# Patient Record
Sex: MALE | URBAN - METROPOLITAN AREA
[De-identification: names, ages, dates, MRNs, and addresses within clinical notes are randomized per-mention and may not be internally consistent; named-entity substitution may affect disease eponyms.]

---

## 2023-04-11 NOTE — TELEPHONE ENCOUNTER
Action    Action Taken Request sent to [facility] to push US image 23- Pradeep  Resent 23- Pradeep           MEDICAL RECORDS REQUEST   [facility]  [facility]  [address] [place] [id number]  PHONE: [phone]  Fax: [phone]        FUTURE VISIT INFORMATION                                                   Bruno Tillman, : 1940 scheduled for future visit at [facility]    APPOINTMENT INFORMATION:    Date: 23    Provider:  Myles Oliver    Reason for Visit/Diagnosis: incontinence and wanting to discuss botox    RECORDS REQUESTED FOR VISIT                                                     NOTES  STATUS/DETAILS   OFFICE NOTE from other specialist  yes- [facility] visit 23, 22   MEDICATION LIST  yes   IMAGING (IMAGES & REPORT)  in process - US Bladder with post void done 10/24/22- [facility]     PRE-VISIT CHECKLIST      Record collection complete No- In process

## 2023-05-01 ENCOUNTER — PRE VISIT (OUTPATIENT)
Dept: UROLOGY | Facility: CLINIC | Age: 83
End: 2023-05-01

## 2024-02-05 ENCOUNTER — MEDICAL CORRESPONDENCE (OUTPATIENT)
Dept: HEALTH INFORMATION MANAGEMENT | Facility: CLINIC | Age: 84
End: 2024-02-05

## 2024-04-26 ENCOUNTER — MEDICAL CORRESPONDENCE (OUTPATIENT)
Dept: HEALTH INFORMATION MANAGEMENT | Facility: CLINIC | Age: 84
End: 2024-04-26
Payer: COMMERCIAL